# Patient Record
Sex: FEMALE | Race: ASIAN | NOT HISPANIC OR LATINO | Employment: FULL TIME | URBAN - METROPOLITAN AREA
[De-identification: names, ages, dates, MRNs, and addresses within clinical notes are randomized per-mention and may not be internally consistent; named-entity substitution may affect disease eponyms.]

---

## 2017-05-26 ENCOUNTER — APPOINTMENT (OUTPATIENT)
Dept: WOMENS IMAGING | Facility: HOSPITAL | Age: 42
End: 2017-05-26

## 2017-05-26 PROCEDURE — 77067 SCR MAMMO BI INCL CAD: CPT | Performed by: RADIOLOGY

## 2021-01-18 ENCOUNTER — OFFICE VISIT (OUTPATIENT)
Dept: OBSTETRICS AND GYNECOLOGY | Age: 46
End: 2021-01-18

## 2021-01-18 ENCOUNTER — PROCEDURE VISIT (OUTPATIENT)
Dept: OBSTETRICS AND GYNECOLOGY | Age: 46
End: 2021-01-18

## 2021-01-18 ENCOUNTER — APPOINTMENT (OUTPATIENT)
Dept: WOMENS IMAGING | Facility: HOSPITAL | Age: 46
End: 2021-01-18

## 2021-01-18 VITALS
SYSTOLIC BLOOD PRESSURE: 108 MMHG | BODY MASS INDEX: 18.25 KG/M2 | DIASTOLIC BLOOD PRESSURE: 64 MMHG | HEIGHT: 63 IN | WEIGHT: 103 LBS

## 2021-01-18 DIAGNOSIS — Z11.51 SPECIAL SCREENING EXAMINATION FOR HUMAN PAPILLOMAVIRUS (HPV): ICD-10-CM

## 2021-01-18 DIAGNOSIS — Z12.31 VISIT FOR SCREENING MAMMOGRAM: Primary | ICD-10-CM

## 2021-01-18 DIAGNOSIS — Z83.3 FAMILY HISTORY OF DIABETES MELLITUS: ICD-10-CM

## 2021-01-18 DIAGNOSIS — Z01.419 ENCOUNTER FOR GYNECOLOGICAL EXAMINATION WITHOUT ABNORMAL FINDING: Primary | ICD-10-CM

## 2021-01-18 DIAGNOSIS — D50.8 OTHER IRON DEFICIENCY ANEMIA: ICD-10-CM

## 2021-01-18 DIAGNOSIS — Z12.4 SCREENING FOR MALIGNANT NEOPLASM OF THE CERVIX: ICD-10-CM

## 2021-01-18 PROCEDURE — 77067 SCR MAMMO BI INCL CAD: CPT | Performed by: RADIOLOGY

## 2021-01-18 PROCEDURE — 77067 SCR MAMMO BI INCL CAD: CPT | Performed by: OBSTETRICS & GYNECOLOGY

## 2021-01-18 PROCEDURE — 99386 PREV VISIT NEW AGE 40-64: CPT | Performed by: OBSTETRICS & GYNECOLOGY

## 2021-01-18 NOTE — PROGRESS NOTES
Subjective     Chief Complaint   Patient presents with   • Gynecologic Exam     AC. Last pap and MG both were about 5 years ago.       History of Present Illness    Alma Mckeon is a 45 y.o.  who presents for annual exam.  The patient is a new patient in our office.  She has not been seen for any medical visits for about 5 years.  Patient has 3 children 2 boys and a girl ages 11 8 and 6.  She works from home as a .  She is not having any problems but would like to have some testing for diabetes since her mother has diabetes.  She has a history of anemia and vitamin D deficiency but her cycles are not heavy.  She does not exercise currently.    Her menses are regular every 28-30 days, lasting 3 days, dysmenorrhea moderate, occurring first 1-2 days of flow   Obstetric History:  OB History        3    Para   3    Term   3            AB        Living   3       SAB        TAB        Ectopic        Molar        Multiple        Live Births   3               Menstrual History:     Patient's last menstrual period was 2020.         Current contraception: vasectomy  History of abnormal Pap smear: no  Received Gardasil immunization: no  Perform regular self breast exam : no  Family history of uterine or ovarian cancer: no  Family History of colon cancer: MGF 60   Family history of breast cancer: no    Mammogram: ordered.  Colonoscopy: recommended.  DEXA: not indicated.    Exercise: not active  Calcium/Vitamin D: inadequate intake    The following portions of the patient's history were reviewed and updated as appropriate: allergies, current medications, past family history, past medical history, past social history, past surgical history and problem list.    Review of Systems    Review of Systems   Constitutional: Negative for fatigue.   Respiratory: Negative for shortness of breath.    Gastrointestinal: Negative for abdominal pain.   Genitourinary: Negative for dysuria.  "  Neurological: Negative for headaches.   Psychiatric/Behavioral: Negative for dysphoric mood.     Objective   Physical Exam    /64   Ht 160 cm (63\")   Wt 46.7 kg (103 lb)   LMP 12/27/2020   Breastfeeding No   BMI 18.25 kg/m²     General:   alert, appears stated age and cooperative, petite small framed female   Neck: thyroid normal to palpation   Heart: regular rate and rhythm   Lungs: clear to auscultation bilaterally   Abdomen: soft, non-tender, without masses or organomegaly   Breast: inspection negative, no nipple discharge or bleeding, no masses or nodularity palpable   Vulva: normal, Bartholin's, Urethra, [address]'s normal   Vagina: normal mucosa, normal discharge   Cervix: no cervical motion tenderness and no lesions   Uterus: non-tender, normal shape and consistency   Adnexa: no mass, fullness, tenderness   Rectal: normal rectal, no masses     Assessment/Plan   Diagnoses and all orders for this visit:    1. Encounter for gynecological examination without abnormal finding (Primary)  -     Ambulatory Referral For Screening Colonoscopy  -     Hemoglobin A1c  -     Lipid Panel  -     TSH  -     CBC (No Diff)  -     IGP, Aptima HPV, Rfx 16 / 18,45    2. Other iron deficiency anemia  -     Ambulatory Referral For Screening Colonoscopy  -     Hemoglobin A1c  -     Lipid Panel  -     TSH  -     CBC (No Diff)    3. Family history of diabetes mellitus  -     Ambulatory Referral For Screening Colonoscopy  -     Hemoglobin A1c  -     Lipid Panel  -     TSH  -     CBC (No Diff)    4. Screening for malignant neoplasm of the cervix  -     IGP, Aptima HPV, Rfx 16 / 18,45    5. Special screening examination for human papillomavirus (HPV)  -     IGP, Aptima HPV, Rfx 16 / 18,45    Mammogram will be done today and colonoscopy is ordered  Encouraged the patient to see a primary care doctor also.  Check baseline labs.  Patient has a history of anemia but does not have heavy cycles.  Encouraged exercise and calcium and " vitamin D supplementation.    All questions answered.  Breast self exam technique reviewed and patient encouraged to perform self-exam monthly.  Discussed healthy lifestyle modifications.

## 2021-01-19 ENCOUNTER — TELEPHONE (OUTPATIENT)
Dept: OBSTETRICS AND GYNECOLOGY | Age: 46
End: 2021-01-19

## 2021-01-19 LAB
CHOLEST SERPL-MCNC: 162 MG/DL (ref 0–200)
ERYTHROCYTE [DISTWIDTH] IN BLOOD BY AUTOMATED COUNT: 14.3 % (ref 12.3–15.4)
HBA1C MFR BLD: 5.8 % (ref 4.8–5.6)
HCT VFR BLD AUTO: 41.4 % (ref 34–46.6)
HDLC SERPL-MCNC: 63 MG/DL (ref 40–60)
HGB BLD-MCNC: 12.6 G/DL (ref 12–15.9)
LDLC SERPL CALC-MCNC: 83 MG/DL (ref 0–100)
MCH RBC QN AUTO: 23.2 PG (ref 26.6–33)
MCHC RBC AUTO-ENTMCNC: 30.4 G/DL (ref 31.5–35.7)
MCV RBC AUTO: 76.4 FL (ref 79–97)
PLATELET # BLD AUTO: 354 10*3/MM3 (ref 140–450)
RBC # BLD AUTO: 5.42 10*6/MM3 (ref 3.77–5.28)
TRIGL SERPL-MCNC: 84 MG/DL (ref 0–150)
TSH SERPL DL<=0.005 MIU/L-ACNC: 1.23 UIU/ML (ref 0.27–4.2)
VLDLC SERPL CALC-MCNC: 16 MG/DL (ref 5–40)
WBC # BLD AUTO: 6.95 10*3/MM3 (ref 3.4–10.8)

## 2021-01-19 NOTE — TELEPHONE ENCOUNTER
----- Message from Pollo Rivero MD sent at 1/19/2021 12:25 PM EST -----  Please notify that hemoglobin A1c is elevated in the prediabetic range at 5.8.  Recommend patient follow-up with primary care.  She may need medication like Metformin.  Her cholesterol panel is normal.  Thyroid testing is normal and the patient is not anemic.

## 2021-01-20 LAB
CYTOLOGIST CVX/VAG CYTO: NORMAL
CYTOLOGY CVX/VAG DOC CYTO: NORMAL
CYTOLOGY CVX/VAG DOC THIN PREP: NORMAL
DX ICD CODE: NORMAL
HIV 1 & 2 AB SER-IMP: NORMAL
HPV I/H RISK 4 DNA CVX QL PROBE+SIG AMP: NEGATIVE
OTHER STN SPEC: NORMAL
STAT OF ADQ CVX/VAG CYTO-IMP: NORMAL

## 2021-01-21 ENCOUNTER — TELEPHONE (OUTPATIENT)
Dept: OBSTETRICS AND GYNECOLOGY | Age: 46
End: 2021-01-21

## 2021-04-17 ENCOUNTER — APPOINTMENT (OUTPATIENT)
Dept: VACCINE CLINIC | Facility: HOSPITAL | Age: 46
End: 2021-04-17

## 2022-07-01 ENCOUNTER — TELEPHONE (OUTPATIENT)
Dept: OBSTETRICS AND GYNECOLOGY | Age: 47
End: 2022-07-01

## 2022-07-01 NOTE — TELEPHONE ENCOUNTER
Caller: Alma Rowland    Relationship: Self    Best call back number: [phone]    What orders are you requesting (i.e. lab or imaging): MAMMO AND GLUCOSE TEST    In what timeframe would the patient need to come in: PT HAS MAMMO AND ANNUAL SCHEDULED FOR 11.14 AT 10:20 AND 2:45 W/ DR. SHAW    Where will you receive your lab/imaging services: [facility]    Additional notes: PT NEEDS ORDERS PLACED FOR MAMMO SCHEDULED FOR 11.14.22 AT 10:20 AT [facility]. PT IS ALSO REQUESTING A GLUCOSE TEST DONE AT ANNUAL ON 11.14.22 AT 2:45 W/ DR. SHAW. PT IS PRE-DIABETIC.

## 2022-09-13 ENCOUNTER — TELEPHONE (OUTPATIENT)
Dept: FAMILY MEDICINE CLINIC | Facility: CLINIC | Age: 47
End: 2022-09-13

## 2022-09-13 NOTE — TELEPHONE ENCOUNTER
Caller: Alma Rowland    Relationship: Self    Best call back number: [phone]    Who are you requesting to speak with (clinical staff, provider,  specific staff member): CLINICAL STAFF     What was the call regarding: WANTING TO KNOW IF SHE CAN GET AN A1C DONE AT APPOINTMENT ON Thursday     Do you require a callback: YES

## 2022-09-15 ENCOUNTER — OFFICE VISIT (OUTPATIENT)
Dept: FAMILY MEDICINE CLINIC | Facility: CLINIC | Age: 47
End: 2022-09-15

## 2022-09-15 VITALS
HEIGHT: 63 IN | BODY MASS INDEX: 18.73 KG/M2 | TEMPERATURE: 97.3 F | SYSTOLIC BLOOD PRESSURE: 113 MMHG | OXYGEN SATURATION: 97 % | DIASTOLIC BLOOD PRESSURE: 77 MMHG | WEIGHT: 105.7 LBS | HEART RATE: 72 BPM

## 2022-09-15 DIAGNOSIS — Z00.00 ROUTINE GENERAL MEDICAL EXAMINATION AT A HEALTH CARE FACILITY: Primary | ICD-10-CM

## 2022-09-15 DIAGNOSIS — Z11.59 ENCOUNTER FOR HEPATITIS C SCREENING TEST FOR LOW RISK PATIENT: ICD-10-CM

## 2022-09-15 DIAGNOSIS — R20.2 NUMBNESS AND TINGLING: ICD-10-CM

## 2022-09-15 DIAGNOSIS — R20.0 NUMBNESS AND TINGLING: ICD-10-CM

## 2022-09-15 DIAGNOSIS — R73.03 PREDIABETES: ICD-10-CM

## 2022-09-15 DIAGNOSIS — F51.04 PSYCHOPHYSIOLOGICAL INSOMNIA: ICD-10-CM

## 2022-09-15 DIAGNOSIS — Z12.11 SCREEN FOR COLON CANCER: ICD-10-CM

## 2022-09-15 PROCEDURE — 99386 PREV VISIT NEW AGE 40-64: CPT | Performed by: NURSE PRACTITIONER

## 2022-09-15 RX ORDER — CHLORAL HYDRATE 500 MG
CAPSULE ORAL
COMMUNITY

## 2022-09-15 NOTE — PROGRESS NOTES
Chief Complaint: She is here to establish care for prediabetes.     The patient has consented to RICHIE.  Establish Care and Tingling (Bilateral tingling in feet and legs, sometimes arm )    Subjective        May Suzanne Mckeon presents to [facility]  History of Present Illness     Overall health  The patient considers herself healthy, but was previously diagnosed with prediabetes. She notes that she was diagnosed about a year ago. The patient has been reading self-help books to manage her diabetes. She reports that 3 weeks ago, she started experiencing tingling in her feet, which has now escalated into her extremities. The patient notes her extremities are itchy.    Diet  She tries to control her diet, reducing rice and carbohydrates. The patient would like to learn how to manage diabetes, and reverse the symptoms. She eats meat, and tries to maintain a good diet. The patient eats fish, salmon, vegetables, and reduces her sugar.    Gestational diabetes  She reports that she had gestational diabetes 7 years ago.     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 She likes her job, but it is stressful. She notes that her company offers a program to assist with diabetes.     Sleep  She notes that her stress is affecting her sleep. The patient only gets 4-5 hours of sleep at night. She states that she has difficulty falling back asleep.     Family  She is  and has 3 children. Her children are involved in sports and very active. She is taking care of her mother-in-law, which is stressful. Her  is very supportive. The patient's  had a vasectomy.     Cultural background  She is [place]. The patient enjoys eating rice. Her parents are in the [facility], and she has a good relationship with her family. She communicates frequently with her family.     Menses  She continues to have regular menses.  "    Imaging  She denies having a colonoscopy in the past.     Other appointments  She notes that she has an appointment with Dr. Rivero in 11/2022 for GYN.    Exercise  She ambulates, swims, and does HIT workout for exercise.     On exam  The patient denies lung problems, heart problems, chest pain or heart palpitations. She denies any allergy issues. The patient has regular dental appointments. She denies stomach issues, nausea, vomiting, diarrhea, or constipation. The patient notes that she has frequent urination. She denies constant thirst. The patient tries to increase her fluids. She denies skin issues, muscles aches, or pains. The patient denies any back problems.     Vaccinations  She believes she is up to date on her vaccines.    Family history  She notes that her mom, and all her aunts and uncles are diabetic. Her mom had a mastectomy because of having lumps in her breast, but no breast cancer. Her maternal grandfather had colon cancer.       Objective   Vital Signs:  /77   Pulse 72   Temp 97.3 °F (36.3 °C) (Temporal)   Ht 160 cm (63\")   Wt 47.9 kg (105 lb 11.2 oz)   SpO2 97%   BMI 18.72 kg/m²   Estimated body mass index is 18.72 kg/m² as calculated from the following:    Height as of this encounter: 160 cm (63\").    Weight as of this encounter: 47.9 kg (105 lb 11.2 oz).    BMI is within normal parameters. No other follow-up for BMI required.      Physical Exam  Vitals and nursing note reviewed.   Constitutional:       General: She is not in acute distress.     Appearance: She is well-developed. She is not ill-appearing.      Comments: Physically fit appearing, appears younger than stated age   HENT:      Head: Normocephalic and atraumatic.      Right Ear: Tympanic membrane, ear canal and external ear normal.      Left Ear: Tympanic membrane, ear canal and external ear normal.      Mouth/Throat:      Mouth: Mucous membranes are moist.      Pharynx: Uvula midline. No posterior oropharyngeal " erythema.   Eyes:      General: No scleral icterus.        Right eye: No discharge.         Left eye: No discharge.      Conjunctiva/sclera: Conjunctivae normal.      Pupils: Pupils are equal, round, and reactive to light.   Neck:      Thyroid: No thyromegaly.   Cardiovascular:      Rate and Rhythm: Normal rate and regular rhythm.      Heart sounds: Normal heart sounds. No murmur heard.  Pulmonary:      Effort: Pulmonary effort is normal.      Breath sounds: Normal breath sounds.   Abdominal:      General: Bowel sounds are normal.      Palpations: Abdomen is soft.      Tenderness: There is no abdominal tenderness.   Musculoskeletal:         General: No deformity.      Cervical back: Neck supple.      Comments: Gait smooth and steady   Lymphadenopathy:      Cervical: No cervical adenopathy.   Skin:     General: Skin is warm and dry.   Neurological:      General: No focal deficit present.      Mental Status: She is alert and oriented to person, place, and time.   Psychiatric:         Attention and Perception: Attention and perception normal.         Mood and Affect: Mood and affect normal.         Speech: Speech normal.         Behavior: Behavior normal. Behavior is cooperative.         Thought Content: Thought content normal.         Cognition and Memory: Cognition normal.      Comments: Very pleasant, conversant, good medical historian        Result Review :                Assessment and Plan             Diagnoses and all orders for this visit:    1. Routine general medical examination at a health care facility (Primary)  -     CBC & Differential  -     Comprehensive Metabolic Panel  -     Lipid Panel With LDL / HDL Ratio  -     Microalbumin / Creatinine Urine Ratio - Urine, Clean Catch  -     TSH Rfx On Abnormal To Free T4  -     Ferritin  -     Iron Profile    2. Prediabetes  -     Hemoglobin A1c  -     Microalbumin / Creatinine Urine Ratio - Urine, Clean Catch    3. Numbness and tingling  -     Vitamin B12  -      Ferritin  -     Iron Profile    4. Psychophysiological insomnia    5. Screen for colon cancer  -     Cologuard - Stool, Per Rectum; Future    6. Encounter for hepatitis C screening test for low risk patient  -     Hepatitis C Antibody    Appropriate health maintenance and prevention topics specific for this patient were discussed today.  Additionally, health goals, and health concerns addressed as appropriate.  Pt was encouraged to stay up to date on recommended screenings and vaccines based on USPSTF guidelines.     Will check A1C today in addition to routine labs.  Discussed mgmt options for diabetes, prediabetes, monitoring.      cologuard for CRC screen as she prefers less invasive method-no first degree relative with colon cancer.  Aware if positive will need colonoscopy.     She prefers to keep close eye on A1C and would like to recheck in 3 months.          Follow Up   Return in about 3 weeks (around 10/6/2022) for Recheck.  Patient was given instructions and counseling regarding her condition or for health maintenance advice. Please see specific information pulled into the AVS if appropriate.       Transcribed from ambient dictation for JERONIMO Wooten by Yasmeen Jama.  09/15/22   12:45 EDT    Patient verbalized consent to the visit recording.  I have personally performed the services described in this document as transcribed by the above individual, and it is both accurate and complete.  Yasmeen Jama  9/20/2022  12:45 EDT

## 2022-09-16 LAB
ALBUMIN SERPL-MCNC: 4.8 G/DL (ref 3.5–5.2)
ALBUMIN/CREAT UR: 32 MG/G CREAT (ref 0–29)
ALBUMIN/GLOB SERPL: 2.2 G/DL
ALP SERPL-CCNC: 58 U/L (ref 39–117)
ALT SERPL-CCNC: 17 U/L (ref 1–33)
AST SERPL-CCNC: 18 U/L (ref 1–32)
BASOPHILS # BLD AUTO: 0.02 10*3/MM3 (ref 0–0.2)
BASOPHILS NFR BLD AUTO: 0.4 % (ref 0–1.5)
BILIRUB SERPL-MCNC: 0.7 MG/DL (ref 0–1.2)
BUN SERPL-MCNC: 18 MG/DL (ref 6–20)
BUN/CREAT SERPL: 25.7 (ref 7–25)
CALCIUM SERPL-MCNC: 9.6 MG/DL (ref 8.6–10.5)
CHLORIDE SERPL-SCNC: 106 MMOL/L (ref 98–107)
CHOLEST SERPL-MCNC: 149 MG/DL (ref 0–200)
CO2 SERPL-SCNC: 25.6 MMOL/L (ref 22–29)
CREAT SERPL-MCNC: 0.7 MG/DL (ref 0.57–1)
CREAT UR-MCNC: 190.4 MG/DL
EGFRCR SERPLBLD CKD-EPI 2021: 106.8 ML/MIN/1.73
EOSINOPHIL # BLD AUTO: 0.08 10*3/MM3 (ref 0–0.4)
EOSINOPHIL NFR BLD AUTO: 1.6 % (ref 0.3–6.2)
ERYTHROCYTE [DISTWIDTH] IN BLOOD BY AUTOMATED COUNT: 14.7 % (ref 12.3–15.4)
FERRITIN SERPL-MCNC: 57.4 NG/ML (ref 13–150)
GLOBULIN SER CALC-MCNC: 2.2 GM/DL
GLUCOSE SERPL-MCNC: 95 MG/DL (ref 65–99)
HBA1C MFR BLD: 5.7 % (ref 4.8–5.6)
HCT VFR BLD AUTO: 42.1 % (ref 34–46.6)
HCV AB S/CO SERPL IA: <0.1 S/CO RATIO (ref 0–0.9)
HDLC SERPL-MCNC: 75 MG/DL (ref 40–60)
HGB BLD-MCNC: 12.6 G/DL (ref 12–15.9)
IMM GRANULOCYTES # BLD AUTO: 0.01 10*3/MM3 (ref 0–0.05)
IMM GRANULOCYTES NFR BLD AUTO: 0.2 % (ref 0–0.5)
IRON SATN MFR SERPL: 23 % (ref 20–50)
IRON SERPL-MCNC: 109 MCG/DL (ref 37–145)
LDLC SERPL CALC-MCNC: 64 MG/DL (ref 0–100)
LDLC/HDLC SERPL: 0.88 {RATIO}
LYMPHOCYTES # BLD AUTO: 1.03 10*3/MM3 (ref 0.7–3.1)
LYMPHOCYTES NFR BLD AUTO: 20 % (ref 19.6–45.3)
MCH RBC QN AUTO: 22.5 PG (ref 26.6–33)
MCHC RBC AUTO-ENTMCNC: 29.9 G/DL (ref 31.5–35.7)
MCV RBC AUTO: 75.3 FL (ref 79–97)
MICROALBUMIN UR-MCNC: 61.2 UG/ML
MONOCYTES # BLD AUTO: 0.25 10*3/MM3 (ref 0.1–0.9)
MONOCYTES NFR BLD AUTO: 4.8 % (ref 5–12)
NEUTROPHILS # BLD AUTO: 3.77 10*3/MM3 (ref 1.7–7)
NEUTROPHILS NFR BLD AUTO: 73 % (ref 42.7–76)
NRBC BLD AUTO-RTO: 0 /100 WBC (ref 0–0.2)
PLATELET # BLD AUTO: 328 10*3/MM3 (ref 140–450)
POTASSIUM SERPL-SCNC: 4.5 MMOL/L (ref 3.5–5.2)
PROT SERPL-MCNC: 7 G/DL (ref 6–8.5)
RBC # BLD AUTO: 5.59 10*6/MM3 (ref 3.77–5.28)
SODIUM SERPL-SCNC: 143 MMOL/L (ref 136–145)
TIBC SERPL-MCNC: 483 MCG/DL
TRIGL SERPL-MCNC: 41 MG/DL (ref 0–150)
TSH SERPL DL<=0.005 MIU/L-ACNC: 1.1 UIU/ML (ref 0.27–4.2)
UIBC SERPL-MCNC: 374 MCG/DL (ref 112–346)
VIT B12 SERPL-MCNC: 851 PG/ML (ref 211–946)
VLDLC SERPL CALC-MCNC: 10 MG/DL (ref 5–40)
WBC # BLD AUTO: 5.16 10*3/MM3 (ref 3.4–10.8)

## 2022-11-14 ENCOUNTER — OFFICE VISIT (OUTPATIENT)
Dept: OBSTETRICS AND GYNECOLOGY | Age: 47
End: 2022-11-14

## 2022-11-14 ENCOUNTER — APPOINTMENT (OUTPATIENT)
Dept: WOMENS IMAGING | Facility: HOSPITAL | Age: 47
End: 2022-11-14

## 2022-11-14 ENCOUNTER — PROCEDURE VISIT (OUTPATIENT)
Dept: OBSTETRICS AND GYNECOLOGY | Age: 47
End: 2022-11-14

## 2022-11-14 VITALS
WEIGHT: 109 LBS | BODY MASS INDEX: 19.31 KG/M2 | DIASTOLIC BLOOD PRESSURE: 64 MMHG | HEIGHT: 63 IN | SYSTOLIC BLOOD PRESSURE: 106 MMHG

## 2022-11-14 DIAGNOSIS — R73.03 PREDIABETES: ICD-10-CM

## 2022-11-14 DIAGNOSIS — Z01.419 ENCOUNTER FOR GYNECOLOGICAL EXAMINATION WITHOUT ABNORMAL FINDING: Primary | ICD-10-CM

## 2022-11-14 DIAGNOSIS — Z12.11 COLON CANCER SCREENING: ICD-10-CM

## 2022-11-14 DIAGNOSIS — Z12.31 VISIT FOR SCREENING MAMMOGRAM: Primary | ICD-10-CM

## 2022-11-14 DIAGNOSIS — R71.8 LOW MEAN CORPUSCULAR VOLUME (MCV): ICD-10-CM

## 2022-11-14 PROCEDURE — 77067 SCR MAMMO BI INCL CAD: CPT | Performed by: RADIOLOGY

## 2022-11-14 PROCEDURE — 77067 SCR MAMMO BI INCL CAD: CPT | Performed by: OBSTETRICS & GYNECOLOGY

## 2022-11-14 PROCEDURE — 99396 PREV VISIT EST AGE 40-64: CPT | Performed by: OBSTETRICS & GYNECOLOGY

## 2022-11-14 PROCEDURE — 77063 BREAST TOMOSYNTHESIS BI: CPT | Performed by: RADIOLOGY

## 2022-11-14 PROCEDURE — 77063 BREAST TOMOSYNTHESIS BI: CPT | Performed by: OBSTETRICS & GYNECOLOGY

## 2022-11-14 NOTE — PROGRESS NOTES
"Subjective     Chief Complaint   Patient presents with   • Gynecologic Exam     AC & MG        History of Present Illness    Alma Mckeon is a 47 y.o.  who presents for annual exam.  The patient has noticed with prediabetes.  She would like her hemoglobin A1c rechecked.  She has changed her diet but is not exercising.  She complains of insomnia with waking up at 3 AM.  Her cycles are still regular.  She has not done her colonoscopy.  Her menses are regular every 28-30 days, lasting 4 days, dysmenorrhea mild, occurring first 1-2 days of flow   Obstetric History:  OB History        3    Para   3    Term   3            AB        Living   3       SAB        IAB        Ectopic        Molar        Multiple        Live Births   3               Menstrual History:     Patient's last menstrual period was 2022.         Current contraception: vasectomy  History of abnormal Pap smear: no  Received Gardasil immunization: no  Perform regular self breast exam : no  Family history of uterine or ovarian cancer: no  Family History of colon cancer: yes - MGF 60   Family history of breast cancer: no    Mammogram: done today.  Colonoscopy: recommended.   DEXA: not indicated.    Exercise: no   Calcium/Vitamin D: adequate intake and uses supplements    The following portions of the patient's history were reviewed and updated as appropriate: allergies, current medications, past family history, past medical history, past social history, past surgical history and problem list.    Review of Systems    Review of Systems   Constitutional: Negative for fatigue.   Respiratory: Negative for shortness of breath.    Gastrointestinal: Negative for abdominal pain.   Genitourinary: Negative for dysuria.   Neurological: Negative for headaches.   Psychiatric/Behavioral: Negative for dysphoric mood.     Objective   Physical Exam    /64   Ht 160 cm (63\")   Wt 49.4 kg (109 lb)   LMP 2022   BMI 19.31 " kg/m²     General:   alert, appears stated age and cooperative   Neck: thyroid normal to palpation   Heart: regular rate and rhythm   Lungs: clear to auscultation bilaterally   Abdomen: soft, non-tender, without masses or organomegaly   Breast: inspection negative, no nipple discharge or bleeding, no masses or nodularity palpable   Vulva: normal, Bartholin's, Urethra, [place]'s normal   Vagina: normal mucosa, normal discharge   Cervix: no cervical motion tenderness and no lesions   Uterus: non-tender, normal shape and consistency   Adnexa: no mass, fullness, tenderness   Rectal: normal rectal, no masses     Assessment & Plan   Diagnoses and all orders for this visit:    1. Encounter for gynecological examination without abnormal finding (Primary)    2. Colon cancer screening  -     Ambulatory Referral For Screening Colonoscopy    3. Prediabetes  -     Hemoglobin A1c    4. Low mean corpuscular volume (MCV)  -     Hemoglobinopathy Fractionation [place]    Colonoscopy is ordered  Recommend exercise specially with prediabete  Check labs today.    All questions answered.  Breast self exam technique reviewed and patient encouraged to perform self-exam monthly.  Discussed healthy lifestyle modifications.  Recommended 30 minutes of aerobic exercise five times per week.  Discussed calcium needs to prevent osteoporosis.

## 2022-11-15 LAB
HBA1C MFR BLD: 5.8 % (ref 4.8–5.6)
HGB A MFR BLD ELPH: 97.1 % (ref 96.4–98.8)
HGB A2 MFR BLD ELPH: 2.3 % (ref 1.8–3.2)
HGB F MFR BLD ELPH: 0.6 % (ref 0–2)
HGB FRACT BLD-IMP: NORMAL
HGB S MFR BLD ELPH: 0 %